# Patient Record
Sex: FEMALE | Employment: FULL TIME | URBAN - METROPOLITAN AREA
[De-identification: names, ages, dates, MRNs, and addresses within clinical notes are randomized per-mention and may not be internally consistent; named-entity substitution may affect disease eponyms.]

---

## 2017-02-10 ENCOUNTER — TELEPHONE (OUTPATIENT)
Dept: FAMILY MEDICINE CLINIC | Age: 42
End: 2017-02-10

## 2017-02-10 NOTE — TELEPHONE ENCOUNTER
LVM for pt to call office regarding referral to GYN. Inquiring if pt has been to another facility or if she just want us to close it.

## 2021-09-26 ENCOUNTER — NURSE TRIAGE (OUTPATIENT)
Dept: OTHER | Facility: CLINIC | Age: 46
End: 2021-09-26

## 2021-09-26 NOTE — TELEPHONE ENCOUNTER
Reason for Disposition   [1] MODERATE difficulty breathing (e.g., speaks in phrases, SOB even at rest, pulse 100-120) AND [2] NEW-onset or WORSE than normal    Answer Assessment - Initial Assessment Questions  1. RESPIRATORY STATUS: \"Describe your breathing? \" (e.g., wheezing, shortness of breath, unable to speak, severe coughing)       Pt's SaO2 was about 91% for a time this am 9/26/21 and feels some tightness in her chest.  Has a cough, and has a lot of congestion    2. ONSET: \"When did this breathing problem begin? \"       Today, 9/26/21    3. PATTERN \"Does the difficult breathing come and go, or has it been constant since it started? \"       Cough is starting to get worse, and needed a rescue inhaler to help SaO2 get back up to mid 90s    4. SEVERITY: \"How bad is your breathing? \" (e.g., mild, moderate, severe)     - MILD: No SOB at rest, mild SOB with walking, speaks normally in sentences, can lay down, no retractions, pulse < 100.     - MODERATE: SOB at rest, SOB with minimal exertion and prefers to sit, cannot lie down flat, speaks in phrases, mild retractions, audible wheezing, pulse 100-120.     - SEVERE: Very SOB at rest, speaks in single words, struggling to breathe, sitting hunched forward, retractions, pulse > 120       Moderate to severe    5. RECURRENT SYMPTOM: \"Have you had difficulty breathing before? \" If so, ask: \"When was the last time? \" and \"What happened that time? \"       +COVID for the last two weeks    6. CARDIAC HISTORY: \"Do you have any history of heart disease? \" (e.g., heart attack, angina, bypass surgery, angioplasty)    none    7. LUNG HISTORY: \"Do you have any history of lung disease? \"  (e.g., pulmonary embolus, asthma, emphysema)     None    8. CAUSE: \"What do you think is causing the breathing problem? \"      +COVID    9. OTHER SYMPTOMS: \"Do you have any other symptoms? (e.g., dizziness, runny nose, cough, chest pain, fever)      Pt had a severe headache overnight last night. Pre-diabeticand some elevated BP    10. PREGNANCY: \"Is there any chance you are pregnant? \" \"When was your last menstrual period? \"     No    11. TRAVEL: \"Have you traveled out of the country in the last month? \" (e.g., travel history, exposures)        None    Protocols used: BREATHING DIFFICULTY-ADULT-AH      Brief description of triage: pt has worsening symptoms of SOB, cough, and lowering SaO2 readings (this am, 91% and the use of an inhaler raised SaO2 to mid 90s)    Triage indicates for patient to be seen by Provider. Care advice provided, patient verbalizes understanding; denies any other questions or concerns; instructed to call back for any new or worsening symptoms. Attention Provider: Thank you for allowing me to participate in the care of your patient. The patient was connected to triage in response to information provided to the [facility]. Please do not respond through this encounter as the response is not directed to a shared pool.

## 2021-09-27 ENCOUNTER — PATIENT OUTREACH (OUTPATIENT)
Dept: OTHER | Age: 46
End: 2021-09-27

## 2021-09-27 NOTE — PROGRESS NOTES
Date/Time:  9/27/2021 10:53 AM  Attempted to reach patient by telephone. Left Street[employer] compliant message requesting a return call. Will await call back and/or try again this afternoon. Patient visited ED at [facility] on 9/26 for covid symptoms. Per notes, patient had been diagnosed with covid about three weeks ago. Continued SOB, chest tightness, cough, joint pain, diaphoresis and malaise/fatigue. Oxygen saturation was 91-92% upon presentation. Prescriptions for an albuterol inhaler and zofran were provided to patient. Was referred to ED by her PCP. Recommended for follow up with PCP as soon as possible for a visit. Date/Time:  9/27/2021 3:21 PM  Attempted to reach patient by telephone. Left StreetcarA compliant message requesting a return call. Hand off to RIVERA Cornell, for follow up.

## 2021-09-28 ENCOUNTER — PATIENT OUTREACH (OUTPATIENT)
Dept: OTHER | Age: 46
End: 2021-09-28

## 2021-09-28 NOTE — LETTER
Ms. Júnior Green  [address], [address] [facility]           Dear Ms. Tamiko Cho,    My name is Kelley, [address] for [employer], and I have been trying to reach you. The Associate Care Management (ACM) program is a free-of-charge confidential service provided to our associates and their family members covered by the [facility]. The program will provide an associate and his/her family with the [address] [place] [address] expertise to assist in navigating the health care delivery system, provider services, and their overall care needsso as to assure and improve health care interactions and enhance the quality of life. This program is designed to provide you with the opportunity to have a [employer] partner with you for the following services:     1) when you come home from the hospital or emergency room   2) when help is needed to manage your disease   3) when you need assistance coordinating services or appointments  4) when you need additional education, resources or assistance reaching your Be Well   Health Program goals/requirements such as Be Well With Diabetes      [address] [place] [address] is dedicated to empowering the good health of its community and improving the quality of care and care experiences for associates and their families. We are committed to safeguarding patient confidentiality and privacy, assuring that every associate has the respect he or she deserves in managing their health. The information shared with your care manager will not be shared with anyone else aside from those you identify as part of your care team, and will only be used to assist you with any identified care needs. Please contact me if you would like this service provided to you.      Sincerely,    Danielle Chi RN, BSN  Associate Care Management   [address] [place] [address]   [address] [facility]  [id number]  Cell [email or web address]    Angeles Macdonald

## 2021-09-28 NOTE — PROGRESS NOTES
9/28/2021, Per chart review, ED at [facility] on 9/26 for covid symptoms. Per notes, patient had been diagnosed with covid about three weeks ago. Continued SOB, chest tightness, cough, joint pain, diaphoresis and malaise/fatigue. Spouse of Employee/PLUS Plan:      9/28, Patient identified as eligible for [address] [place] [address] Management services. Telephone outreach attempted. Left discreet voicemail with this [facility] confidential contact information and request for return call. PLAN: Will send UTR letter. Will attempt outreach again to offer CM services. See Previous Documentation:  Date/Time:      9/27/2021 10:53 AM  Attempted to reach patient by telephone. Left HIPPA compliant message requesting a return call. Will await call back and/or try again this afternoon.     Patient visited ED at [facility] on 9/26 for covid symptoms. Per notes, patient had been diagnosed with covid about three weeks ago. Continued SOB, chest tightness, cough, joint pain, diaphoresis and malaise/fatigue. Oxygen saturation was 91-92% upon presentation. Prescriptions for an albuterol inhaler and zofran were provided to patient. Was referred to ED by her PCP. Recommended for follow up with PCP as soon as possible for a visit.     Date/Time:      9/27/2021 3:21 PM  Attempted to reach patient by telephone. Left HIPPA compliant message requesting a return call.       Hand off to RIVERA Melgar, for follow up.

## 2021-09-30 ENCOUNTER — PATIENT OUTREACH (OUTPATIENT)
Dept: OTHER | Age: 46
End: 2021-09-30

## 2021-09-30 NOTE — PROGRESS NOTES
9/30/2021,  Per chart review, ED at [facility] on 9/26 for covid symptoms. Per notes, patient had been diagnosed with covid about three weeks ago.  Continued SOB, chest tightness, cough, joint pain, diaphoresis and malaise/fatigue  9/30, Patient identified as eligible for [address] [place] [address] services. Telephone outreach attempted. Left discreet voicemail with this [facility] confidential contact information and request for return call. PLAN: UTR letter has been sent. Will attempt outreach again, next week, to offer CM services. See Previous Documentation:  9/28/2021, Per chart review, ED at [facility] on 9/26 for covid symptoms. Per notes, patient had been diagnosed with covid about three weeks ago.  Continued SOB, chest tightness, cough, joint pain, diaphoresis and malaise/fatigue. Spouse of Employee/PLUS Plan:       9/28, Patient identified as eligible for [address] [place] [address] Management services. Telephone outreach attempted. Left discreet voicemail with this [facility] confidential contact information and request for return call. PLAN: Will send UTR letter. Will attempt outreach again to offer CM services.      See Previous Documentation:  Date/Time:      9/27/2021 10:53 AM  Attempted to reach patient by telephone. Left HIPPA compliant message requesting a return call. Will await call back and/or try again this afternoon.     Patient visited ED at [facility] on 9/26 for covid symptoms. Per notes, patient had been diagnosed with covid about three weeks ago.  Continued SOB, chest tightness, cough, joint pain, diaphoresis and malaise/fatigue.  Oxygen saturation was 91-92% upon presentation.  Prescriptions for an albuterol inhaler and zofran were provided to patient. Was referred to ED by her PCP.  Recommended for follow up with PCP as soon as possible for a visit.     Date/Time:      9/27/2021 3:21 PM  Attempted to reach patient by telephone. Left HIPPA compliant message requesting a return call.       Hand off to RIVERA Edgar, for follow up.

## 2021-11-09 ENCOUNTER — PATIENT OUTREACH (OUTPATIENT)
Dept: OTHER | Age: 46
End: 2021-11-09

## 2021-11-09 NOTE — PROGRESS NOTES
11/9/2021, S/P ED at [facility] on 9/26 for covid symptoms. Per notes, patient had been diagnosed with covid about three weeks ago.  Continued SOB, chest tightness, cough, joint pain, diaphoresis and malaise/fatigue  Per chart review, no further ED/Hospitalizations >30days. No response to this ACM from outreach attempts, LM's nor UTR Letter. 11/9, Patient identified as eligible for [address] outreach attempted.  Left discreet voicemail with this AC confidential contact information and request for return call.   PLAN: UTR letter has been sent. Will npt make further telephone outreach attempts at this time. Will remain available should she return call or have further CM needs. See Previous Documentation:  9/30/2021,  Per chart review, ED at [facility] on 9/26 for covid symptoms. Per notes, patient had been diagnosed with covid about three weeks ago.  Continued SOB, chest tightness, cough, joint pain, diaphoresis and malaise/fatigue  9/30, Patient identified as eligible for [address] outreach attempted.  Left discreet voicemail with this AC confidential contact information and request for return call.   PLAN: UTR letter has been sent. Will attempt outreach again, next week, to offer CM services.      See Previous Documentation:  9/28/2021, Per chart review, ED at [facility] on 9/26 for covid symptoms.  Per notes, patient had been diagnosed with covid about three weeks ago.  Continued SOB, chest tightness, cough, joint pain, diaphoresis and malaise/fatigue.   Spouse of Employee/PLUS Plan:       9/28, Patient identified as eligible for [address] outreach attempted.  Left discreet voicemail with this AC confidential contact information and request for return call.   PLAN: Will send UTR letter. Will attempt outreach again to offer CM services.      See Previous Documentation:  Date/Time:      9/27/2021 10:53 AM  Attempted to reach patient by telephone. Left PO-MOA compliant message requesting a return call. Will await call back and/or try again this afternoon.     Patient visited ED at [facility] on 9/26 for covid symptoms. Per notes, patient had been diagnosed with covid about three weeks ago.  Continued SOB, chest tightness, cough, joint pain, diaphoresis and malaise/fatigue.  Oxygen saturation was 91-92% upon presentation.  Prescriptions for an albuterol inhaler and zofran were provided to patient. Was referred to ED by her PCP. Recommended for follow up with PCP as soon as possible for a visit.     Date/Time:      9/27/2021 3:21 PM  Attempted to reach patient by telephone. Left PO-MOA compliant message requesting a return call.       Hand off to RIVERA Hogan, for follow up.

## 2023-01-31 RX ORDER — ALBUTEROL SULFATE 90 UG/1
AEROSOL, METERED RESPIRATORY (INHALATION)
COMMUNITY
Start: 2021-09-26

## 2023-01-31 RX ORDER — ONDANSETRON 4 MG/1
4 TABLET, ORALLY DISINTEGRATING ORAL EVERY 8 HOURS PRN
COMMUNITY
Start: 2021-09-26